# Patient Record
Sex: FEMALE | Race: BLACK OR AFRICAN AMERICAN | NOT HISPANIC OR LATINO | Employment: FULL TIME | URBAN - METROPOLITAN AREA
[De-identification: names, ages, dates, MRNs, and addresses within clinical notes are randomized per-mention and may not be internally consistent; named-entity substitution may affect disease eponyms.]

---

## 2017-01-11 ENCOUNTER — OFFICE VISIT (OUTPATIENT)
Dept: OBSTETRICS AND GYNECOLOGY | Facility: CLINIC | Age: 32
End: 2017-01-11

## 2017-01-11 VITALS
SYSTOLIC BLOOD PRESSURE: 128 MMHG | WEIGHT: 270.6 LBS | BODY MASS INDEX: 42.47 KG/M2 | DIASTOLIC BLOOD PRESSURE: 80 MMHG | HEIGHT: 67 IN

## 2017-01-11 DIAGNOSIS — Z09 POSTOP CHECK: Primary | ICD-10-CM

## 2017-01-11 PROCEDURE — 99212 OFFICE O/P EST SF 10 MIN: CPT | Performed by: OBSTETRICS & GYNECOLOGY

## 2017-01-11 NOTE — PROGRESS NOTES
Subjective   Elisa Savage  is a 31 y.o. female who presents for a 2 week checkup after  delivery.  Her baby is doing well.  The patient is feeling well.  Bowels and bladder are functioning normally.  Surgical pain has nearly resolved    Chief Complaint   Patient presents with   • Postpartum Care     2 weeks from        HPI    History reviewed. No pertinent past medical history.    Past Surgical History   Procedure Laterality Date   •  section N/A 2016     Procedure:  SECTION PRIMARY;  Surgeon: Junaid Ruiz MD;  Location: [facility] LABOR DELIVERY;  Service:        Social History     Social History   • Marital status: Single     Spouse name: N/A   • Number of children: N/A   • Years of education: N/A     Occupational History   • Not on file.     Social History Main Topics   • Smoking status: Never Smoker   • Smokeless tobacco: Never Used   • Alcohol use No      Comment: before pregnancy   • Drug use: No   • Sexual activity: Yes     Partners: Male     Other Topics Concern   • Not on file     Social History Narrative       The following portions of the patient's history were reviewed and updated as appropriate: allergies, current medications, past family history, past medical history, past social history, past surgical history and problem list.    Review of Systems    Objective     Physical Exam   Constitutional: She appears well-developed and well-nourished.   Abdominal:   The abdomen is soft and nondistended.  Pfannenstiel incision is well approximated.  Erythema and induration are absent   Nursing note and vitals reviewed.      Assessment    Elisa was seen today for postpartum care.    Diagnoses and all orders for this visit:    Postop check        Plan  1.  Appropriate progress 2 weeks postop.  Follow-up in 4 weeks for a postpartum checkup  2.  Contraception.  Counseled regarding options.  Patient is considering Mirena or Nexplanon.  Pressures were given.  The patient will  decide at her next visit

## 2017-01-11 NOTE — MR AVS SNAPSHOT
Elisa Savage   1/11/2017 11:20 AM   Office Visit    Dept Phone:  [phone]   Encounter #:  [id number]    Provider:  Junaid Ruiz MD   Department:  [facility]                Your Full Care Plan              Your Updated Medication List          This list is accurate as of: 1/11/17  2:16 PM.  Always use your most recent med list.                CITRANATAL ASSURE 35-1 & 300 MG tablet   Take 1 tablet by mouth daily.        MG capsule   Take 100 mg by mouth 2 (Two) Times a Day As Needed for constipation.       ibuprofen 600 MG tablet   Commonly known as:  ADVIL,MOTRIN   Take 1 tablet by mouth Every 8 (Eight) Hours As Needed for mild pain (1-3).               You Were Diagnosed With        Codes Comments    Postop check    -  Primary ICD-10-CM: Z09  ICD-9-CM: V67.00       Instructions     None    Patient Instructions History      Upcoming Appointments     Visit Type Date Time Department    GYN FOLLOW UP 1/11/2017 11:20 AM [facility] [place]    POSTPARTUM 2/8/2017  9:30 AM [facility] [place]      [facility] Signup     Our records indicate that you have an active [facility] account.    You can view your After Visit Summary by going to [facility] and logging in with your [employer] username and password.  If you don't have a [employer] username and password but a parent or guardian has access to your record, the parent or guardian should login with their own [employer] username and password and access your record to view the After Visit Summary.    If you have questions, you can email [email or web address] or call [phone] to talk to our [employer] staff.  Remember, [employer] is NOT to be used for urgent needs.  For medical emergencies, dial 911.               Other Info from Your Visit           Your Appointments     Feb 08, 2017  9:30 AM EST   Postpartum with Junaid Ruiz MD   [facility] (--)    [address]  "[address] [address]   [phone]              Allergies     No Known Allergies      Reason for Visit     Postpartum Care 2 weeks from       Vital Signs     Blood Pressure Height Weight Last Menstrual Period Body Mass Index Smoking Status    128/80 67\" (170.2 cm) 270 lb 9.6 oz (123 kg) 2016 (Approximate) 42.38 kg/m2 Never Smoker      Problems and Diagnoses Noted     Encounter for examination following surgery    -  Primary        "

## 2017-01-15 ENCOUNTER — OFFICE VISIT (OUTPATIENT)
Dept: RETAIL CLINIC | Facility: CLINIC | Age: 32
End: 2017-01-15

## 2017-01-15 DIAGNOSIS — Z23 INFLUENZA VACCINE NEEDED: Primary | ICD-10-CM

## 2017-01-15 PROCEDURE — 86580 TB INTRADERMAL TEST: CPT | Performed by: NURSE PRACTITIONER

## 2017-01-15 NOTE — PROGRESS NOTES
Pt was given the tb shot in left forearm and tolerated the shot and was told to come back on 1/17/2017 to have it read and to receive paper work

## 2021-04-16 ENCOUNTER — BULK ORDERING (OUTPATIENT)
Dept: CASE MANAGEMENT | Facility: OTHER | Age: 36
End: 2021-04-16

## 2021-04-16 DIAGNOSIS — Z23 IMMUNIZATION DUE: ICD-10-CM
